# Patient Record
Sex: FEMALE | Race: WHITE | URBAN - METROPOLITAN AREA
[De-identification: names, ages, dates, MRNs, and addresses within clinical notes are randomized per-mention and may not be internally consistent; named-entity substitution may affect disease eponyms.]

---

## 2018-02-21 ENCOUNTER — APPOINTMENT (OUTPATIENT)
Dept: LAB | Facility: CLINIC | Age: 68
End: 2018-02-21
Attending: INTERNAL MEDICINE
Payer: MEDICARE

## 2018-02-21 PROCEDURE — 81376 HLA II TYPING 1 LOCUS LR: CPT | Mod: XU | Performed by: INTERNAL MEDICINE

## 2018-02-21 PROCEDURE — 81370 HLA I & II TYPING LR: CPT | Performed by: INTERNAL MEDICINE

## 2018-03-06 ENCOUNTER — APPOINTMENT (OUTPATIENT)
Dept: LAB | Facility: CLINIC | Age: 68
End: 2018-03-06
Attending: INTERNAL MEDICINE
Payer: MEDICARE

## 2018-03-06 PROCEDURE — 81376 HLA II TYPING 1 LOCUS LR: CPT | Performed by: INTERNAL MEDICINE

## 2018-03-06 PROCEDURE — 81370 HLA I & II TYPING LR: CPT | Performed by: INTERNAL MEDICINE

## 2018-05-04 ENCOUNTER — RECORDS - HEALTHEAST (OUTPATIENT)
Dept: LAB | Facility: CLINIC | Age: 68
End: 2018-05-04

## 2018-05-07 LAB
BASOPHILS # BLD AUTO: 0 THOU/UL (ref 0–0.2)
BASOPHILS NFR BLD AUTO: 0 % (ref 0–2)
EOSINOPHIL # BLD AUTO: 0 THOU/UL (ref 0–0.4)
EOSINOPHIL NFR BLD AUTO: 1 % (ref 0–6)
ERYTHROCYTE [DISTWIDTH] IN BLOOD BY AUTOMATED COUNT: 18.2 % (ref 11–14.5)
HCT VFR BLD AUTO: 28.2 % (ref 35–47)
HGB BLD-MCNC: 9.3 G/DL (ref 12–16)
LYMPHOCYTES # BLD AUTO: 1.2 THOU/UL (ref 0.8–4.4)
LYMPHOCYTES NFR BLD AUTO: 44 % (ref 20–40)
MCH RBC QN AUTO: 32.3 PG (ref 27–34)
MCHC RBC AUTO-ENTMCNC: 33 G/DL (ref 32–36)
MCV RBC AUTO: 98 FL (ref 80–100)
MONOCYTES # BLD AUTO: 0.5 THOU/UL (ref 0–0.9)
MONOCYTES NFR BLD AUTO: 18 % (ref 2–10)
NEUTROPHILS # BLD AUTO: 1 THOU/UL (ref 2–7.7)
NEUTROPHILS NFR BLD AUTO: 36 % (ref 50–70)
PLATELET # BLD AUTO: 67 THOU/UL (ref 140–440)
PMV BLD AUTO: 10 FL (ref 8.5–12.5)
RBC # BLD AUTO: 2.88 MILL/UL (ref 3.8–5.4)
WBC: 2.8 THOU/UL (ref 4–11)

## 2018-05-10 ENCOUNTER — RECORDS - HEALTHEAST (OUTPATIENT)
Dept: LAB | Facility: CLINIC | Age: 68
End: 2018-05-10

## 2018-05-11 LAB
ERYTHROCYTE [DISTWIDTH] IN BLOOD BY AUTOMATED COUNT: 18.1 % (ref 11–14.5)
HCT VFR BLD AUTO: 27 % (ref 35–47)
HGB BLD-MCNC: 8.8 G/DL (ref 12–16)
MCH RBC QN AUTO: 32.4 PG (ref 27–34)
MCHC RBC AUTO-ENTMCNC: 32.6 G/DL (ref 32–36)
MCV RBC AUTO: 99 FL (ref 80–100)
PLATELET # BLD AUTO: 76 THOU/UL (ref 140–440)
PMV BLD AUTO: 9.8 FL (ref 8.5–12.5)
RBC # BLD AUTO: 2.72 MILL/UL (ref 3.8–5.4)
WBC: 3 THOU/UL (ref 4–11)

## 2018-05-13 ENCOUNTER — RECORDS - HEALTHEAST (OUTPATIENT)
Dept: LAB | Facility: CLINIC | Age: 68
End: 2018-05-13

## 2018-05-14 LAB
ALBUMIN SERPL-MCNC: 2.6 G/DL (ref 3.5–5)
ALP SERPL-CCNC: 105 U/L (ref 45–120)
ALT SERPL W P-5'-P-CCNC: 24 U/L (ref 0–45)
AST SERPL W P-5'-P-CCNC: 15 U/L (ref 0–40)
BILIRUB DIRECT SERPL-MCNC: <0.1 MG/DL
BILIRUB SERPL-MCNC: 0.3 MG/DL (ref 0–1)
MAGNESIUM SERPL-MCNC: 1.6 MG/DL (ref 1.8–2.6)
PREALB SERPL-MCNC: 27.1 MG/DL (ref 19–38)
PROT SERPL-MCNC: 6.4 G/DL (ref 6–8)

## 2018-05-18 ENCOUNTER — RECORDS - HEALTHEAST (OUTPATIENT)
Dept: LAB | Facility: CLINIC | Age: 68
End: 2018-05-18

## 2018-05-21 LAB
BASOPHILS # BLD AUTO: 0 THOU/UL (ref 0–0.2)
BASOPHILS NFR BLD AUTO: 1 % (ref 0–2)
EOSINOPHIL COUNT (ABSOLUTE): 0 THOU/UL (ref 0–0.4)
EOSINOPHIL NFR BLD AUTO: 1 % (ref 0–6)
ERYTHROCYTE [DISTWIDTH] IN BLOOD BY AUTOMATED COUNT: 17.9 % (ref 11–14.5)
HCT VFR BLD AUTO: 24.7 % (ref 35–47)
HGB BLD-MCNC: 8.1 G/DL (ref 12–16)
LYMPHOCYTES # BLD AUTO: 1.7 THOU/UL (ref 0.8–4.4)
LYMPHOCYTES NFR BLD AUTO: 49 % (ref 20–40)
MCH RBC QN AUTO: 32.8 PG (ref 27–34)
MCHC RBC AUTO-ENTMCNC: 32.8 G/DL (ref 32–36)
MCV RBC AUTO: 100 FL (ref 80–100)
METAMYELOCYTES (ABSOLUTE): 0 THOU/UL
METAMYELOCYTES NFR BLD MANUAL: 1 %
MONOCYTES # BLD AUTO: 0.5 THOU/UL (ref 0–0.9)
MONOCYTES NFR BLD AUTO: 15 % (ref 2–10)
PLAT MORPH BLD: ABNORMAL
PLATELET # BLD AUTO: 74 THOU/UL (ref 140–440)
PMV BLD AUTO: 9.9 FL (ref 8.5–12.5)
RBC # BLD AUTO: 2.47 MILL/UL (ref 3.8–5.4)
TOTAL NEUTROPHILS-ABS(DIFF): 1.3 THOU/UL (ref 2–7.7)
TOTAL NEUTROPHILS-REL(DIFF): 35 % (ref 50–70)
WBC: 3.6 THOU/UL (ref 4–11)

## 2018-09-20 ENCOUNTER — ALLIED HEALTH/NURSE VISIT (OUTPATIENT)
Dept: TRANSPLANT | Facility: CLINIC | Age: 68
End: 2018-09-20
Attending: INTERNAL MEDICINE
Payer: MEDICARE

## 2018-09-20 VITALS
OXYGEN SATURATION: 97 % | SYSTOLIC BLOOD PRESSURE: 143 MMHG | HEART RATE: 89 BPM | RESPIRATION RATE: 16 BRPM | WEIGHT: 207.3 LBS | BODY MASS INDEX: 34.54 KG/M2 | DIASTOLIC BLOOD PRESSURE: 94 MMHG | HEIGHT: 65 IN

## 2018-09-20 DIAGNOSIS — C92.01 AML (ACUTE MYELOID LEUKEMIA) IN REMISSION (H): Primary | ICD-10-CM

## 2018-09-20 DIAGNOSIS — Z71.9 ENCOUNTER FOR COUNSELING: Primary | ICD-10-CM

## 2018-09-20 RX ORDER — MAGNESIUM OXIDE 400 MG/1
400 TABLET ORAL
COMMUNITY
Start: 2018-03-28

## 2018-09-20 RX ORDER — HYDROMORPHONE HYDROCHLORIDE 2 MG/1
1-2 TABLET ORAL
COMMUNITY
Start: 2018-05-01

## 2018-09-20 RX ORDER — MIRTAZAPINE 7.5 MG/1
7.5 TABLET, FILM COATED ORAL
COMMUNITY

## 2018-09-20 RX ORDER — PREDNISOLONE ACETATE 10 MG/ML
1 SUSPENSION/ DROPS OPHTHALMIC
COMMUNITY
Start: 2018-05-25

## 2018-09-20 RX ORDER — ISOSORBIDE MONONITRATE 60 MG/1
60 TABLET, EXTENDED RELEASE ORAL
COMMUNITY
Start: 2018-05-30 | End: 2019-05-30

## 2018-09-20 RX ORDER — VENLAFAXINE 75 MG/1
75 TABLET ORAL
COMMUNITY

## 2018-09-20 RX ORDER — AMOXICILLIN 250 MG
CAPSULE ORAL
COMMUNITY
Start: 2018-05-30

## 2018-09-20 RX ORDER — METOPROLOL TARTRATE 75 MG/1
75 TABLET, FILM COATED ORAL
COMMUNITY
Start: 2018-05-30

## 2018-09-20 RX ORDER — ASPIRIN 81 MG/1
81 TABLET ORAL
COMMUNITY

## 2018-09-20 RX ORDER — FUROSEMIDE 40 MG
40 TABLET ORAL
COMMUNITY
Start: 2018-03-28

## 2018-09-20 RX ORDER — LACOSAMIDE 100 MG/1
100 TABLET ORAL
COMMUNITY
Start: 2018-06-06

## 2018-09-20 RX ORDER — ACETAMINOPHEN 325 MG/1
650 TABLET ORAL
COMMUNITY
Start: 2018-05-01

## 2018-09-20 RX ORDER — NITROGLYCERIN 0.4 MG/1
0.4 TABLET SUBLINGUAL
COMMUNITY
Start: 2010-07-07

## 2018-09-20 RX ORDER — ATORVASTATIN CALCIUM 40 MG/1
40 TABLET, FILM COATED ORAL
COMMUNITY
Start: 2018-07-10 | End: 2019-07-10

## 2018-09-20 RX ORDER — POTASSIUM CHLORIDE 1500 MG/1
40 TABLET, EXTENDED RELEASE ORAL
COMMUNITY
Start: 2018-05-02

## 2018-09-20 RX ORDER — ONDANSETRON 4 MG/1
4 TABLET, ORALLY DISINTEGRATING ORAL
COMMUNITY
Start: 2018-03-28

## 2018-09-20 RX ORDER — FAMOTIDINE 20 MG/1
20 TABLET, FILM COATED ORAL
COMMUNITY
Start: 2018-03-28

## 2018-09-20 NOTE — PROGRESS NOTES
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 Hilton and her daughter come in to discuss options today regarding stem cell transplant or alternative consolidation for her acute leukemia that is currently in remission.    In brief Bri is a pleasant 68-year-old woman with a past medical history notable for hypertension diabetes hypercholesterolemia coronary artery disease status post 3 stents who presented in January 2018 with increased heart rate and palpitations.      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  Cytogenetics came back normal at 40 6XX and her molecular studies showed her to be NPM 1 negative, foot 3 negative, CEBPA single allelic mutation.    She underwent induction chemotherapy with 7+3.  Day 14 bone marrow is 10-20% cellular with 80% blasts.  She then was re-induced with MEC with her day 14 marrow aplastic with no leukemia.  Unfortunately she had numerous complications during that hospital stay that lasted from February 1 - March 28.  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   She had an episode a of subarachnoid hemorrhage, seizures, acute kidney injury coming close to needing dialysis,Episodes of SVT and A. fib, and butt abscess.     She transferred to [place] on March 28 and stayed there through May 1.  She then went to [facility] rehab and left there at the end of May 2018.    Follow-up bone marrow biopsy with count recovery in June 2018 was 30-40% cellular with 1-2% blasts that did not have an abnormal phenotype and a white count of 4.3, hemoglobin of 10, platelets of 111.    At this point she is getting stronger she is back to doing some bowling  "and golfing doing some housework around the house and finally feels like she is getting her strength back.  She notes no active infectious symptoms no chest pain no shortness of breath no abdominal pain no bleeding or bruising.    10 point review of systems otherwise negative    Past medical history:  1.  AML see HPI  2.  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  3.  Aneurysm and subarachnoid hemorrhage  4.  Episodes of seizures during her hospitalization  5.  History of MI  6.  Coronary artery disease status post 3 stents  7.  Diabetes  8.  Hypertension  9.  History of CHF  10.  Hypercholesterolemia  11.  Status post  ×3  12.  Episodes of acute kidney injury that resolved    Family history: No history of leukemia     Social history: She is  and has 2 children one accompanies her today.  No tobacco occasional alcohol.  She has 5 grandchildren    Physical exam:  BP (!) 143/94 (BP Location: Right arm, Patient Position: Right side, Cuff Size: Adult Regular)  Pulse 89  Resp 16  Ht 1.655 m (5' 5.16\")  Wt 94 kg (207 lb 4.8 oz)  SpO2 97%  BMI 34.33 kg/m2  General Bri looks stable and actually quite well compared to everything she has been through S is 80.  No rashes no icterus  Normal gait  Full exam not performed as all of the 60 minute visit spent reviewing her course to date and next steps.    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    1.  AML: I had a long discussion with Bri and her daughter today regarding the natural history of AML.  Specifically we discussed  hematopoiesis and the problems that happen in the bone marrow that leads to AML formation.  We discussed that the " first goal is to do induction chemotherapy as she has received to get her marrow to the point where we cannot detect it.  Luckily she has achieved a CR and that CR has been maintained as of the last marrow in June despite no therapy for 3 months prior.  We discussed that even when patients are in a morphologic remission we need to do some form of consolidation because there is still microscopic disease present in the body that will relapse without some form of consolidation.  We discussed the typical consolidation is either chemotherapy with intermediate or high dose cytarabine or allogenic stem cell transplant.  We discussed that in certain cases such as inversion 16, translocation 8;21, NPM 1, byallelic CEBPA,  chemotherapy itself can be curative.  She does not fit that category.  We discussed that she fits in the intermediate risk category which meta-analysis would suggest that people benefit from allogeneic stem cell transplant.  However given her many many medical comorbidities and her complications with her induction chemotherapy I think her chance of therapy-related mortality is at least 40% with a stem cell transplant.  Her chance of cure with the transplant is 40% given those numbers in close proximity I would not recommend stem cell transplant at this time.    We discussed the options going forward.  Bri asked what would happen if she did no further therapy.  I told her that she would relapse at some point but it is unclear how long it would take for that relapse to happen.  She is currently been off therapy for 6 months and has not relapsed which is a good sign and speaks to a possible more indolent natural history of her leukemia.  That being said a more aggressive approach would be to either do consolidation with intermediate dose cytarabine for 3 cycles versus maintenance therapy with azacitidine.  I told her there is no head-to-head comparison of consolidation cytarabine versus Azacytidine.  I told  her that Azacytidine would not be a curative therapy but would push her relapse down the road and hopefully would give her a couple of years of good quality of life.  After discussing the pros and cons of both intermediate dose cytarabine and the Azacytidine she is in favor of doing outpatient azacitidine on a monthly basis for as long as she is responding and tolerating it.    Thus, she would like to initiate the azacitidine therapy with you.  We talked about the standard side effects of Aza cytidine including the skin irritation from the injections that primrose oil can help, the drop in blood counts and possible need for transfusions, the possible risk of infection if her neutrophils drop, the mild GI side effects.  We discussed that overall this is a very well-tolerated low intensity outpatient chemotherapy and I am optimistic she will do well with it.    She asked about surveillance bone marrow biopsies.  I told her that I would let her counts dictate when a bone marrow biopsy would be done.  Specifically of her counts go down and trend back up to baseline prior to each cycle of Azacytidine I do not see a reason to do a bone marrow biopsy.  However if her counts do not recover normally then I would recommend doing a biopsy at that time.  We discussed that if and when she relapses repeat molecular testing would then help guide our next choice for therapies.    She and her daughter asked many good questions and at the end felt as if their questions were answered.  They will be coming back to you to move forward with the Azacytidine.    60 minutes spent with patient and family.    Serena Anthony

## 2018-09-20 NOTE — MR AVS SNAPSHOT
After Visit Summary   9/20/2018    Bri Hilton    MRN: [id number]           Patient Information     Date Of Birth          1950        Visit Information        Provider Department      9/20/2018 9:00 AM Serena Anthony MD [facility] Blood and Marrow Transplant        Today's Diagnoses     AML (acute myeloid leukemia) in remission (H)    -  1          [facility] ([facility]  [address] [place] [address]  Phone: [phone]  Clinic Hours:   Monday-Thursday:7am to 7pm   Friday: 7am to 5pm   Weekends and holidays:    8am to noon (in general)  If your fever is 100.5  or greater,   call the clinic.  After hours call the   hospital at [phone] or   [phone]. Ask for the BMT   fellow on-call            Follow-ups after your visit        Your next 10 appointments already scheduled     Sep 20, 2018 10:30 AM CDT   (Arrive by 10:15 AM)   BMT [facility] with DHAVAL Bedoya,  2 119 CONSULT [facility] Blood and Marrow Transplant ([facility])    [address] [place] [address]  [address]  [facility] [address] [phone]              Who to contact     If you have questions or need follow up information about today's clinic visit or your schedule please contact [facility] BLOOD AND MARROW TRANSPLANT directly at [phone].  Normal or non-critical lab and imaging results will be communicated to you by MyChart, letter or phone within 4 business days after the clinic has received the results. If you do not hear from us within 7 days, please contact the clinic through Safehishart or phone. If you have a critical or abnormal lab result, we will notify you by phone as soon as possible.  Submit refill requests through Personaling or call your pharmacy and they will forward the refill request to us. Please allow 3 business days for your refill to be completed.          Additional Information About Your Visit        MyChart Information     Personaling lets you  "send messages to your doctor, view your test results, renew your prescriptions, schedule appointments and more. To sign up, go to [email or web address] . Click on \"Log in\" on the left side of the screen, which will take you to the Welcome page. Then click on \"Sign up Now\" on the right side of the page.     You will be asked to enter the access code listed below, as well as some personal information. Please follow the directions to create your username and password.     Your access code is: [email or web address]  Expires: 2018  6:30 AM     Your access code will  in 90 days. If you need help or a new code, please call your [place] clinic or [phone].        Care EveryWhere ID     This is your Care EveryWhere ID. This could be used by other organizations to access your [place] medical records  [id number]        Your Vitals Were     Pulse Respirations Height Pulse Oximetry BMI (Body Mass Index)       89 16 1.655 m (5' 5.16\") 97% 34.33 kg/m2        Blood Pressure from Last 3 Encounters:   18 (!) 143/94    Weight from Last 3 Encounters:   18 94 kg (207 lb 4.8 oz)              Today, you had the following     No orders found for display      Information about OPIOIDS     PRESCRIPTION OPIOIDS: WHAT YOU NEED TO KNOW   We gave you an opioid (narcotic) pain medicine. It is important to manage your pain, but opioids are not always the best choice. You should first try all the other options your care team gave you. Take this medicine for as short a time (and as few doses) as possible.    Some activities can increase your pain, such as bandage changes or therapy sessions. It may help to take your pain medicine 30 to 60 minutes before these activities. Reduce your stress by getting enough sleep, working on hobbies you enjoy and practicing relaxation or meditation. Talk to your care team about ways to manage your pain beyond prescription opioids.    These medicines have risks:    DO NOT drive when on new or " higher doses of pain medicine. These medicines can affect your alertness and reaction times, and you could be arrested for driving under the influence (DUI). If you need to use opioids long-term, talk to your care team about driving.    DO NOT operate heavy machinery    DO NOT do any other dangerous activities while taking these medicines.    DO NOT drink any alcohol while taking these medicines.     If the opioid prescribed includes acetaminophen, DO NOT take with any other medicines that contain acetaminophen. Read all labels carefully. Look for the word  acetaminophen  or  Tylenol.  Ask your pharmacist if you have questions or are unsure.    You can get addicted to pain medicines, especially if you have a history of addiction (chemical, alcohol or substance dependence). Talk to your care team about ways to reduce this risk.    All opioids tend to cause constipation. Drink plenty of water and eat foods that have a lot of fiber, such as fruits, vegetables, prune juice, apple juice and high-fiber cereal. Take a laxative (Miralax, milk of magnesia, Colace, Senna) if you don t move your bowels at least every other day. Other side effects include upset stomach, sleepiness, dizziness, throwing up, tolerance (needing more of the medicine to have the same effect), physical dependence and slowed breathing.    Store your pills in a secure place, locked if possible. We will not replace any lost or stolen medicine. If you don t finish your medicine, please throw away (dispose) as directed by your pharmacist. The [place] Pollution Control Agency has more information about safe disposal: https://www.pca.state.[place]/living-green/managing-unwanted-medications         Recent Review Flowsheet Data     There is no flowsheet data to display.               Primary Care Provider Fax #    Physician No Ref-Primary [phone]       No address on file        Equal Access to Services     LEWIS GUILLORY AH: David Cardona  wakarsonda lushanonadaha, qaybta kaalmada alec, gonzalo pazaan ah. So [facility] [phone].    ATENCIÓN: Si kelvin ely, tiene a villareal disposición servicios gratuitos de asistencia lingüística. Madi al [phone].    We comply with applicable federal civil rights laws and [place] laws. We do not discriminate on the basis of race, color, national origin, age, disability, sex, sexual orientation, or gender identity.            Thank you!     Thank you for choosing [facility] BLOOD AND MARROW TRANSPLANT  for your care. Our goal is always to provide you with excellent care. Hearing back from our patients is one way we can continue to improve our services. Please take a few minutes to complete the written survey that you may receive in the mail after your visit with us. Thank you!             Your Updated Medication List - Protect others around you: Learn how to safely use, store and throw away your medicines at www.disposemymeds.org.          This list is accurate as of 9/20/18 10:16 AM.  Always use your most recent med list.                   Brand Name Dispense Instructions for use Diagnosis    acetaminophen 325 MG tablet    TYLENOL     Take 650 mg by mouth        aspirin 81 MG EC tablet      Take 81 mg by mouth        atorvastatin 40 MG tablet    LIPITOR     Take 40 mg by mouth        calcium carbonate 500 mg-vitamin D 200 units 500-200 MG-UNIT per tablet    OSCAL with D;OYSTER SHELL CALCIUM          famotidine 20 MG tablet    PEPCID     Take 20 mg by mouth        furosemide 40 MG tablet    LASIX     Take 40 mg by mouth        HYDROmorphone 2 MG tablet    DILAUDID     Take 1-2 mg by mouth        isosorbide mononitrate 60 MG 24 hr tablet    IMDUR     Take 60 mg by mouth        Lacosamide 100 MG Tabs tablet    VIMPAT     Take 100 mg by mouth        magnesium oxide 400 MG tablet    MAG-OX     Take 400 mg by mouth        Metoprolol Tartrate 75 MG Tabs      Take 75 mg by mouth        mirtazapine 7.5 MG  Tabs tablet    REMERON     Take 7.5 mg by mouth        nitroGLYcerin 0.4 MG sublingual tablet    NITROSTAT     Place 0.4 mg under the tongue        ondansetron 4 MG ODT tab    ZOFRAN-ODT     Place 4 mg under the tongue        potassium chloride SA 20 MEQ CR tablet    K-DUR/KLOR-CON M     Take 40 mEq by mouth        prednisoLONE acetate 1 % ophthalmic susp    PRED FORTE     1 drop        senna-docusate 8.6-50 MG per tablet    SENOKOT-S;PERICOLACE          venlafaxine 75 MG tablet    EFFEXOR     Take 75 mg by mouth

## 2018-09-20 NOTE — NURSING NOTE
"Oncology Rooming Note    September 20, 2018 9:15 AM   Bri Hilton is a 68 year old female who presents for:    Chief Complaint   Patient presents with     RECHECK     Pt is here for a New consult for AML     Initial Vitals: BP (!) 143/94 (BP Location: Right arm, Patient Position: Right side, Cuff Size: Adult Regular)  Pulse 89  Resp 16  Ht 1.655 m (5' 5.16\")  Wt 94 kg (207 lb 4.8 oz)  SpO2 97%  BMI 34.33 kg/m2 Estimated body mass index is 34.33 kg/(m^2) as calculated from the following:    Height as of this encounter: 1.655 m (5' 5.16\").    Weight as of this encounter: 94 kg (207 lb 4.8 oz). Body surface area is 2.08 meters squared.  Data Unavailable Comment: Data Unavailable   No LMP recorded.  Allergies reviewed: Yes  Medications reviewed: Yes    Medications: Medication refills not needed today.  Pharmacy name entered into EPIC: Data Unavailable    Clinical concerns: none     6 minutes for nursing intake (face to face time)     Leeanna Deal MA              "

## 2018-09-20 NOTE — MR AVS SNAPSHOT
"              After Visit Summary   9/20/2018    Bri Hilton    MRN: [id number]           Patient Information     Date Of Birth          1950        Visit Information        Provider Department      9/20/2018 10:00 AM Coordinator,  Bmt Nurse [facility] Blood and Marrow Transplant        Today's Diagnoses     AML (acute myeloid leukemia) in remission (H)    -  1          [facility] ([facility])  [address] [place] [address]  Phone: [phone]  Clinic Hours:   Monday-Thursday:7am to 7pm   Friday: 7am to 5pm   Weekends and holidays:    8am to noon (in general)  If your fever is 100.5  or greater,   call the clinic.  After hours call the   hospital at [phone] or   [phone]. Ask for the BMT   fellow on-call            Follow-ups after your visit        Who to contact     If you have questions or need follow up information about today's clinic visit or your schedule please contact [facility] BLOOD AND MARROW TRANSPLANT directly at [phone].  Normal or non-critical lab and imaging results will be communicated to you by In2Gameshart, letter or phone within 4 business days after the clinic has received the results. If you do not hear from us within 7 days, please contact the clinic through ANTs Softwaret or phone. If you have a critical or abnormal lab result, we will notify you by phone as soon as possible.  Submit refill requests through CourseHorse or call your pharmacy and they will forward the refill request to us. Please allow 3 business days for your refill to be completed.          Additional Information About Your Visit        In2GamesharAcesoBee Information     CourseHorse lets you send messages to your doctor, view your test results, renew your prescriptions, schedule appointments and more. To sign up, go to [email or web address] . Click on \"Log in\" on the left side of the screen, which will take you to the Welcome page. Then click on \"Sign up Now\" on the right side of the page.     You will be " asked to enter the access code listed below, as well as some personal information. Please follow the directions to create your username and password.     Your access code is: [email or web address]  Expires: 2018  6:30 AM     Your access code will  in 90 days. If you need help or a new code, please call your [place] clinic or [phone].        Care EveryWhere ID     This is your Care EveryWhere ID. This could be used by other organizations to access your [place] medical records  [id number]         Blood Pressure from Last 3 Encounters:   18 (!) 143/94    Weight from Last 3 Encounters:   18 94 kg (207 lb 4.8 oz)              Today, you had the following     No orders found for display       Recent Review Flowsheet Data     There is no flowsheet data to display.               Primary Care Provider Fax #    Physician No Ref-Primary [phone]       No address on file        Equal Access to Services     LEWIS GUILLORY : Hadii yanni turko [place], waaxda luqadaha, qaybta kaalmada adedominiqueyada, gonzalo matthews . So [facility] [phone].    ATENCIÓN: Si habla español, tiene a villareal disposición servicios gratuitos de asistencia lingüística. Llame al [phone].    We comply with applicable federal civil rights laws and [place] laws. We do not discriminate on the basis of race, color, national origin, age, disability, sex, sexual orientation, or gender identity.            Thank you!     Thank you for choosing [facility] BLOOD AND MARROW TRANSPLANT  for your care. Our goal is always to provide you with excellent care. Hearing back from our patients is one way we can continue to improve our services. Please take a few minutes to complete the written survey that you may receive in the mail after your visit with us. Thank you!             Your Updated Medication List - Protect others around you: Learn how to safely use, store and throw away your medicines at www.disposemymeds.org.           This list is accurate as of 9/20/18 12:22 PM.  Always use your most recent med list.                   Brand Name Dispense Instructions for use Diagnosis    acetaminophen 325 MG tablet    TYLENOL     Take 650 mg by mouth        aspirin 81 MG EC tablet      Take 81 mg by mouth        atorvastatin 40 MG tablet    LIPITOR     Take 40 mg by mouth        calcium carbonate 500 mg-vitamin D 200 units 500-200 MG-UNIT per tablet    OSCAL with D;OYSTER SHELL CALCIUM          famotidine 20 MG tablet    PEPCID     Take 20 mg by mouth        furosemide 40 MG tablet    LASIX     Take 40 mg by mouth        HYDROmorphone 2 MG tablet    DILAUDID     Take 1-2 mg by mouth        isosorbide mononitrate 60 MG 24 hr tablet    IMDUR     Take 60 mg by mouth        Lacosamide 100 MG Tabs tablet    VIMPAT     Take 100 mg by mouth        magnesium oxide 400 MG tablet    MAG-OX     Take 400 mg by mouth        Metoprolol Tartrate 75 MG Tabs      Take 75 mg by mouth        mirtazapine 7.5 MG Tabs tablet    REMERON     Take 7.5 mg by mouth        nitroGLYcerin 0.4 MG sublingual tablet    NITROSTAT     Place 0.4 mg under the tongue        ondansetron 4 MG ODT tab    ZOFRAN-ODT     Place 4 mg under the tongue        potassium chloride SA 20 MEQ CR tablet    K-DUR/KLOR-CON M     Take 40 mEq by mouth        prednisoLONE acetate 1 % ophthalmic susp    PRED FORTE     1 drop        senna-docusate 8.6-50 MG per tablet    SENOKOT-S;PERICOLACE          venlafaxine 75 MG tablet    EFFEXOR     Take 75 mg by mouth

## 2018-09-20 NOTE — PROGRESS NOTES
Per RN CC, pt is not a transplant candidate at this time. Therefore, SW does not need to meet with pt today.    DHAVAL Bedoya, [facility]  Phone: [phone]  Pager: [phone]

## 2018-09-20 NOTE — MR AVS SNAPSHOT
"              After Visit Summary   9/20/2018    Bri Hilton    MRN: [id number]           Patient Information     Date Of Birth          1950        Visit Information        Provider Department      9/20/2018 10:30 AM Juana Fragoso MSW;  2 119 CONSULT [facility]        Today's Diagnoses     Encounter for counseling    -  1          [facility] [place]  [address] [place] [id number]  Phone: [phone]  Clinic Hours:   Monday-Thursday:7am to 7pm   Friday: 7am to 5pm   Weekends and holidays:    8am to noon (in general)  If your fever is 100.5  or greater,   call the clinic.  After hours call the   hospital at [phone] or   [phone]. Ask for the BMT   fellow on-call            Follow-ups after your visit        Who to contact     If you have questions or need follow up information about today's clinic visit or your schedule please contact [facility] directly at [phone].  Normal or non-critical lab and imaging results will be communicated to you by AppGeekhart, letter or phone within 4 business days after the clinic has received the results. If you do not hear from us within 7 days, please contact the clinic through [employer] or phone. If you have a critical or abnormal lab result, we will notify you by phone as soon as possible.  Submit refill requests through Fit&Color or call your pharmacy and they will forward the refill request to us. Please allow 3 business days for your refill to be completed.          Additional Information About Your Visit        AppGeekhart Information     Fit&Color lets you send messages to your doctor, view your test results, renew your prescriptions, schedule appointments and more. To sign up, go to [email or web address] . Click on \"Log in\" on the left side of the screen, which will take you to the Welcome page. Then click on \"Sign up Now\" on the right side of the page.     You will be asked to " enter the access code listed below, as well as some personal information. Please follow the directions to create your username and password.     Your access code is: [email or web address]  Expires: 2018  6:30 AM     Your access code will  in 90 days. If you need help or a new code, please call your [place] clinic or [phone].        Care EveryWhere ID     This is your Care EveryWhere ID. This could be used by other organizations to access your [facility] medical records  [id number]         Blood Pressure from Last 3 Encounters:   18 (!) 143/94    Weight from Last 3 Encounters:   18 94 kg (207 lb 4.8 oz)              Today, you had the following     No orders found for display       Recent Review Flowsheet Data     There is no flowsheet data to display.               Primary Care Provider Fax #    Physician No Ref-Primary [phone]       No address on file        Equal Access to Services     LEWIS GUILLORY : Hadii yanni turko [place], waaxda luqadaha, qaybta kaalmada adeegyada, gonzalo matthews . So [facility] [phone].    ATENCIÓN: Si habla español, tiene a villareal disposición servicios gratuitos de asistencia lingüística. Llame al [phone].    We comply with applicable federal civil rights laws and [place] laws. We do not discriminate on the basis of race, color, national origin, age, disability, sex, sexual orientation, or gender identity.            Thank you!     Thank you for choosing [facility]  for your care. Our goal is always to provide you with excellent care. Hearing back from our patients is one way we can continue to improve our services. Please take a few minutes to complete the written survey that you may receive in the mail after your visit with us. Thank you!             Your Updated Medication List - Protect others around you: Learn how to safely use, store and throw away your medicines at www.disposemymeds.org.          This  list is accurate as of 9/20/18 11:23 AM.  Always use your most recent med list.                   Brand Name Dispense Instructions for use Diagnosis    acetaminophen 325 MG tablet    TYLENOL     Take 650 mg by mouth        aspirin 81 MG EC tablet      Take 81 mg by mouth        atorvastatin 40 MG tablet    LIPITOR     Take 40 mg by mouth        calcium carbonate 500 mg-vitamin D 200 units 500-200 MG-UNIT per tablet    OSCAL with D;OYSTER SHELL CALCIUM          famotidine 20 MG tablet    PEPCID     Take 20 mg by mouth        furosemide 40 MG tablet    LASIX     Take 40 mg by mouth        HYDROmorphone 2 MG tablet    DILAUDID     Take 1-2 mg by mouth        isosorbide mononitrate 60 MG 24 hr tablet    IMDUR     Take 60 mg by mouth        Lacosamide 100 MG Tabs tablet    VIMPAT     Take 100 mg by mouth        magnesium oxide 400 MG tablet    MAG-OX     Take 400 mg by mouth        Metoprolol Tartrate 75 MG Tabs      Take 75 mg by mouth        mirtazapine 7.5 MG Tabs tablet    REMERON     Take 7.5 mg by mouth        nitroGLYcerin 0.4 MG sublingual tablet    NITROSTAT     Place 0.4 mg under the tongue        ondansetron 4 MG ODT tab    ZOFRAN-ODT     Place 4 mg under the tongue        potassium chloride SA 20 MEQ CR tablet    K-DUR/KLOR-CON M     Take 40 mEq by mouth        prednisoLONE acetate 1 % ophthalmic susp    PRED FORTE     1 drop        senna-docusate 8.6-50 MG per tablet    SENOKOT-S;PERICOLACE          venlafaxine 75 MG tablet    EFFEXOR     Take 75 mg by mouth

## 2021-06-16 PROBLEM — E43 SEVERE PROTEIN-CALORIE MALNUTRITION (H): Status: ACTIVE | Noted: 2018-03-28

## 2021-06-16 PROBLEM — I33.0 SUBACUTE BACTERIAL ENDOCARDITIS: Status: ACTIVE | Noted: 2018-03-12

## 2021-06-16 PROBLEM — F05 DELIRIUM SECONDARY TO MULTIPLE MEDICAL PROBLEMS: Status: ACTIVE | Noted: 2018-04-03

## 2021-06-16 PROBLEM — Z71.89 GOALS OF CARE, COUNSELING/DISCUSSION: Status: ACTIVE | Noted: 2018-04-03

## 2021-06-16 PROBLEM — H54.62 VISION LOSS OF LEFT EYE: Status: ACTIVE | Noted: 2018-03-28

## 2021-06-16 PROBLEM — Z71.89 ADVANCE CARE PLANNING: Status: ACTIVE | Noted: 2018-04-03

## 2021-06-16 PROBLEM — L03.317 CELLULITIS AND ABSCESS OF BUTTOCK: Status: ACTIVE | Noted: 2018-03-06

## 2021-06-16 PROBLEM — L02.31 CELLULITIS AND ABSCESS OF BUTTOCK: Status: ACTIVE | Noted: 2018-03-06

## 2021-06-16 PROBLEM — R73.9 HYPERGLYCEMIA: Status: ACTIVE | Noted: 2018-03-06

## 2021-06-16 PROBLEM — F32.5 DEPRESSION, MAJOR, IN REMISSION (H): Status: ACTIVE | Noted: 2017-01-17

## 2021-06-16 PROBLEM — C92.00 AML (ACUTE MYELOBLASTIC LEUKEMIA) (H): Status: ACTIVE | Noted: 2018-02-01

## 2021-06-16 PROBLEM — R78.81 BACTEREMIA: Status: ACTIVE | Noted: 2018-03-09

## 2021-06-16 PROBLEM — I60.9 SUBARACHNOID HEMORRHAGE (H): Status: ACTIVE | Noted: 2018-03-09

## 2021-06-16 PROBLEM — E11.9 DIABETES MELLITUS TYPE 2, NONINSULIN DEPENDENT (H): Status: ACTIVE | Noted: 2018-02-01

## 2021-06-16 PROBLEM — I33.0 BACTERIAL ENDOCARDITIS, UNSPECIFIED CHRONICITY: Status: ACTIVE | Noted: 2018-03-28

## 2021-06-16 PROBLEM — F98.8 ADD (ATTENTION DEFICIT DISORDER): Status: ACTIVE | Noted: 2017-01-17

## 2021-06-16 PROBLEM — F41.9 ANXIETY: Status: ACTIVE | Noted: 2017-01-17

## 2021-06-16 PROBLEM — I31.4 PERICARDIAL TAMPONADE: Status: ACTIVE | Noted: 2018-03-28

## 2021-06-16 PROBLEM — Z51.5 PALLIATIVE CARE BY SPECIALIST: Status: ACTIVE | Noted: 2018-04-03

## 2021-06-16 PROBLEM — I10 HYPERTENSION: Status: ACTIVE | Noted: 2018-03-28

## 2021-06-16 PROBLEM — N17.9 ACUTE KIDNEY INJURY (H): Status: ACTIVE | Noted: 2018-03-09

## 2021-06-16 PROBLEM — R19.7 ACUTE DIARRHEA: Status: ACTIVE | Noted: 2018-03-06

## 2023-05-28 NOTE — LETTER
9/20/2018       RE: Bri Hilton  [address] [place] [address]     Dear Colleague,    Thank you for referring your patient, Bri Hilton, to the [facility] at [facility]. Please see a copy of my visit note below.    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 Hilton and her daughter come in to discuss options today regarding stem cell transplant or alternative consolidation for her acute leukemia that is currently in remission.    In brief Bri is a pleasant 68-year-old woman with a past medical history notable for hypertension diabetes hypercholesterolemia coronary artery disease status post 3 stents who presented in January 2018 with increased heart rate and palpitations.      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  Cytogenetics came back normal at 40 6XX and her molecular studies showed her to be NPM 1 negative, foot 3 negative, CEBPA single allelic mutation.    She underwent induction chemotherapy with 7+3.  Day 14 bone marrow is 10-20% cellular with 80% blasts.  She then was re-induced with MEC with her day 14 marrow aplastic with no leukemia.  Unfortunately she had numerous complications during that hospital stay that lasted from February 1 - March 28.  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   She had an episode a of subarachnoid hemorrhage, seizures, acute kidney injury coming close to needing dialysis,Episodes of SVT and A. fib, and butt abscess.     She transferred to [place] on March 28 and stayed there through May 1.  [facility] and left there at the end of May  "2018.    Follow-up bone marrow biopsy with count recovery in 2018 was 30-40% cellular with 1-2% blasts that did not have an abnormal phenotype and a white count of 4.3, hemoglobin of 10, platelets of 111.    At this point she is getting stronger she is back to doing some bowling and golfing doing some housework around the house and finally feels like she is getting her strength back.  She notes no active infectious symptoms no chest pain no shortness of breath no abdominal pain no bleeding or bruising.    10 point review of systems otherwise negative    Past medical history:  1.  AML see HPI  2.  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  3.  Aneurysm and subarachnoid hemorrhage  4.  Episodes of seizures during her hospitalization  5.  History of MI  6.  Coronary artery disease status post 3 stents  7.  Diabetes  8.  Hypertension  9.  History of CHF  10.  Hypercholesterolemia  11.  Status post  ×3  12.  Episodes of acute kidney injury that resolved    Family history: No history of leukemia     Social history: She is  and has 2 children one accompanies her today.  No tobacco occasional alcohol.  She has 5 grandchildren    Physical exam:  BP (!) 143/94 (BP Location: Right arm, Patient Position: Right side, Cuff Size: Adult Regular)  Pulse 89  Resp 16  Ht 1.655 m (5' 5.16\")  Wt 94 kg (207 lb 4.8 oz)  SpO2 97%  BMI 34.33 kg/m2  General Bri looks stable and actually quite well compared to everything she has been through KPS is 80.  No rashes no icterus  Normal gait  Full exam not performed as all of the 60 minute visit spent reviewing her course to date and next steps.    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    1.  AML: I had a long discussion with Bri and her daughter today regarding the natural history of AML.  Specifically we discussed  hematopoiesis and the problems that happen in the bone marrow that leads to AML formation.  We discussed that the first goal is to do induction chemotherapy as she has received to get her marrow to the point where we cannot detect it.  Luckily she has achieved a CR and that CR has been maintained as of the last marrow in June despite no therapy for 3 months prior.  We discussed that even when patients are in a morphologic remission we need to do some form of consolidation because there is still microscopic disease present in the body that will relapse without some form of consolidation.  We discussed the typical consolidation is either chemotherapy with intermediate or high dose cytarabine or allogenic stem cell transplant.  We discussed that in certain cases such as inversion 16, translocation 8;21, NPM 1, byallelic CEBPA,  chemotherapy itself can be curative.  She does not fit that category.  We discussed that she fits in the intermediate risk category which meta-analysis would suggest that people benefit from allogeneic stem cell transplant.  However given her many many medical comorbidities and her complications with her induction chemotherapy I think her chance of therapy-related mortality is at least 40% with a stem cell transplant.  Her chance of cure with the transplant is 40% given those numbers in close proximity I would not recommend stem cell transplant at this time.    We discussed the options going forward.  Bri asked what would happen if she did no further therapy.  I told her that she would relapse at some point but it is unclear how long it would take for that relapse to happen.  She is currently been off therapy for 6 months and has not relapsed which is a good sign and speaks to a possible more indolent natural history of her  No. leukemia.  That being said a more aggressive approach would be to either do consolidation with intermediate dose cytarabine for 3 cycles versus maintenance therapy with azacitidine.  I told her there is no head-to-head comparison of consolidation cytarabine versus Azacytidine.  I told her that Azacytidine would not be a curative therapy but would push her relapse down the road and hopefully would give her a couple of years of good quality of life.  After discussing the pros and cons of both intermediate dose cytarabine and the Azacytidine she is in favor of doing outpatient azacitidine on a monthly basis for as long as she is responding and tolerating it.    Thus, she would like to initiate the azacitidine therapy with you.  We talked about the standard side effects of Aza cytidine including the skin irritation from the injections that primrose oil can help, the drop in blood counts and possible need for transfusions, the possible risk of infection if her neutrophils drop, the mild GI side effects.  We discussed that overall this is a very well-tolerated low intensity outpatient chemotherapy and I am optimistic she will do well with it.    She asked about surveillance bone marrow biopsies.  I told her that I would let her counts dictate when a bone marrow biopsy would be done.  Specifically of her counts go down and trend back up to baseline prior to each cycle of Azacytidine I do not see a reason to do a bone marrow biopsy.  However if her counts do not recover normally then I would recommend doing a biopsy at that time.  We discussed that if and when she relapses repeat molecular testing would then help guide our next choice for therapies.    She and her daughter asked many good questions and at the end felt as if their questions were answered.  They will be coming back to you to move forward with the Azacytidine.    60 minutes spent with patient and family.    Serena Anthony